# Patient Record
Sex: MALE | Race: WHITE | NOT HISPANIC OR LATINO | Employment: FULL TIME | URBAN - METROPOLITAN AREA
[De-identification: names, ages, dates, MRNs, and addresses within clinical notes are randomized per-mention and may not be internally consistent; named-entity substitution may affect disease eponyms.]

---

## 2023-11-27 ENCOUNTER — OFFICE VISIT (OUTPATIENT)
Dept: SURGERY | Facility: CLINIC | Age: 58
End: 2023-11-27
Payer: COMMERCIAL

## 2023-11-27 VITALS
DIASTOLIC BLOOD PRESSURE: 82 MMHG | BODY MASS INDEX: 26.43 KG/M2 | OXYGEN SATURATION: 97 % | HEIGHT: 68 IN | WEIGHT: 174.4 LBS | SYSTOLIC BLOOD PRESSURE: 110 MMHG | RESPIRATION RATE: 16 BRPM | HEART RATE: 79 BPM | TEMPERATURE: 98.2 F

## 2023-11-27 DIAGNOSIS — K40.20 NON-RECURRENT BILATERAL INGUINAL HERNIA WITHOUT OBSTRUCTION OR GANGRENE: Primary | ICD-10-CM

## 2023-11-27 DIAGNOSIS — K42.9 UMBILICAL HERNIA WITHOUT OBSTRUCTION AND WITHOUT GANGRENE: ICD-10-CM

## 2023-11-27 PROCEDURE — 99204 OFFICE O/P NEW MOD 45 MIN: CPT | Performed by: SURGERY

## 2023-11-27 PROCEDURE — 1036F TOBACCO NON-USER: CPT | Performed by: SURGERY

## 2023-11-27 RX ORDER — SODIUM CHLORIDE, SODIUM LACTATE, POTASSIUM CHLORIDE, CALCIUM CHLORIDE 600; 310; 30; 20 MG/100ML; MG/100ML; MG/100ML; MG/100ML
100 INJECTION, SOLUTION INTRAVENOUS CONTINUOUS
Status: CANCELLED | OUTPATIENT
Start: 2023-11-27

## 2023-11-27 RX ORDER — CEFAZOLIN SODIUM 2 G/100ML
2 INJECTION, SOLUTION INTRAVENOUS ONCE
Status: CANCELLED | OUTPATIENT
Start: 2024-02-01 | End: 2023-11-27

## 2023-11-27 NOTE — PROGRESS NOTES
History Of Present Illness  The patient is a 58-year-old male who complains of bilateral inguinal hernias and an umbilical hernia.  The right inguinal hernia occurred 40 years ago after lifting a box.  He initially had pain in the area, but never had it repaired.  He no longer has right inguinal pain.  He does notice a bulge.  The left inguinal hernia and umbilical hernias have been present for about 6 years.  Both have been enlarging and he has discomfort at both areas.  He cannot recall any injury prior to onset of these hernias.          Past Medical History  He has no past medical history on file.    Surgical History  He has a past surgical history that includes Other surgical history (07/12/2021).     Allergies  Patient has no known allergies.    Social History  He reports that he has never smoked. He has never used smokeless tobacco. He reports that he does not currently use alcohol. He reports that he does not use drugs.    Family History  No family history on file.    Review of Systems  Review of Systems:  Constitutional:  no fever, no chills, no significant weight change  Neurological: No history of CVA or seizure disorder  Eyes: No pain, no recent visual change  ENT:  No recent hearing loss  Neck: No pain  Cardiovascular: No chest pain, no history of cardiac disease such as myocardial infarction or arrhythmia or congestive heart failure  Pulmonary: No shortness of breath, no history of pulmonary disease such as pneumonia or COPD  Breast: No history of breast disease or breast mass  Gastrointestinal:   Periumbilical pain.  No nausea or vomiting, no constipation or diarrhea or blood in the stool.  No history of ulcers.  No liver, gallbladder or pancreas disease.  No intestinal disorder.  Genitourinary: No hematuria or dysuria, no kidney disease  Musculoskeletal:  no arthralgia, no muscle or bone pain  Integumentary:  no rash  Psychiatric: Anxiety  Endocrine:  no history of diabetes  Hematologic/Lymphatic: No  "easy bruising or bleeding          Last Recorded Vitals  Blood pressure 110/82, pulse 79, temperature 36.8 °C (98.2 °F), temperature source Temporal, resp. rate 16, height 1.727 m (5' 8\"), weight 79.1 kg (174 lb 6.4 oz), SpO2 97 %.    Physical Exam  Constitutional: Well-developed, well-nourished, alert and oriented, no acute distress  Skin: Warm and dry, no lesions, no rashes, no jaundice  HEENT: Normocephalic, atraumatic, EOMI, no scleral icterus, eyes have no redness or swelling or discharge, external inspection of ears and nose is normal, mucous membranes moist  Neck: Soft, nontender, no mass or adenopathy  Cardiac: Regular rate and rhythm, no murmur  Chest: Patent airway, clear to auscultation, normal breath sounds with good chest expansion, no wheezes or rales or rhonchi noted, thorax symmetric  Abdomen: Nondistended, positive bowel sounds, soft, nontender, no mass.  Umbilical hernia 3 cm diameter hernia sac and approximately 1 to 1.5 cm diameter fascial defect.  Easily reducible.  Bilateral inguinal hernias, reducible.  No femoral hernias palpable.  Rectal: Not performed  Extremities: No injury, no lower extremity edema or calf tenderness  Lymphatic: No cervical adenopathy  Musculoskeletal: Range of motion intact, no joint swelling, normal strength  Neurological: Alert and oriented x3, intact sensory and motor function, no obvious focal neurologic abnormalities, normal gait  Psychological: Appropriate mood and behavior  Patient declined a chaperone       Assessment/Plan   Diagnoses and all orders for this visit:  Non-recurrent bilateral inguinal hernia without obstruction or gangrene  -     Case Request Operating Room: Repair Inguinal Hernia Robot-Assisted, Robotic assisted laparoscopic bilateral inguinal hernia repair with mesh.  Possible open repair.  Open repair of umbilical hernia possibly with mesh.; Standing  Umbilical hernia without obstruction and without gangrene  -     Case Request Operating Room: " Repair Inguinal Hernia Robot-Assisted, Robotic assisted laparoscopic bilateral inguinal hernia repair with mesh.  Possible open repair.  Open repair of umbilical hernia possibly with mesh.; Standing  Other orders  -     Place in outpatient/hospital ambulatory surgery; Standing  -     Vital Signs; Standing  -     Pulse oximetry, spot; Standing  -     Apply sequential compression device; Standing  -     Insert peripheral IV; Standing  -     Saline lock IV; Standing  -     lactated Ringer's infusion  -     Full code; Standing  -     ceFAZolin (Ancef) 2 g in dextrose 5 % in water (D5W) 50 mL IV      58-year-old male with bilateral inguinal hernias and umbilical hernia.  All of the hernias are reducible.  The left inguinal hernia and umbilical hernia are symptomatic.  The right inguinal hernia is asymptomatic.  Recommend repair of the bilateral inguinal hernias with mesh either open or laparoscopic and open repair of the umbilical hernia possibly with mesh.  I discussed the procedure and risks with the patient. The risks include bleeding, infection, mesh infection, recurrence, injury to surrounding structures such as nerves/blood vessels/intestine/bladder, chronic postop pain, cardiac/pulmonary complications.   If the mesh becomes infected it could require excision.  I discussed the alternative of hernia repair without mesh and observation.  I answered numerous questions from the patient.  The patient agrees to have robotic assisted laparoscopic bilateral inguinal herniorrhaphy with mesh with possible open operation and open repair of the umbilical hernia possibly with mesh.  Electronic consent obtained.         Isidro Chance MD

## 2023-11-27 NOTE — LETTER
November 27, 2023     Georgi Burden MD  [address] [facility] [address]    Patient: Frank Shea   YOB: 1965   Date of Visit: 11/27/2023       Dear Dr. Georgi Burden MD:    Thank you for referring Frakn Shea to me for evaluation. Below are my notes for this consultation.  If you have questions, please do not hesitate to call me. I look forward to following your patient along with you.       Sincerely,     Isidro Chance MD      CC: No Recipients  ______________________________________________________________________________________    History Of Present Illness  The patient is a 58-year-old male who complains of bilateral inguinal hernias and an umbilical hernia.  The right inguinal hernia occurred 40 years ago after lifting a box.  He initially had pain in the area, but never had it repaired.  He no longer has right inguinal pain.  He does notice a bulge.  The left inguinal hernia and umbilical hernias have been present for about 6 years.  Both have been enlarging and he has discomfort at both areas.  He cannot recall any injury prior to onset of these hernias.          Past Medical History  He has no past medical history on file.    Surgical History  He has a past surgical history that includes Other surgical history (07/12/2021).     Allergies  Patient has no known allergies.    Social History  He reports that he has never smoked. He has never used smokeless tobacco. He reports that he does not currently use alcohol. He reports that he does not use drugs.    Family History  No family history on file.    Review of Systems  Review of Systems:  Constitutional:  no fever, no chills, no significant weight change  Neurological: No history of CVA or seizure disorder  Eyes: No pain, no recent visual change  ENT:  No recent hearing loss  Neck: No pain  Cardiovascular: No chest pain, no history of cardiac disease such as myocardial infarction or arrhythmia or congestive heart failure  Pulmonary:  "No shortness of breath, no history of pulmonary disease such as pneumonia or COPD  Breast: No history of breast disease or breast mass  Gastrointestinal:   Periumbilical pain.  No nausea or vomiting, no constipation or diarrhea or blood in the stool.  No history of ulcers.  No liver, gallbladder or pancreas disease.  No intestinal disorder.  Genitourinary: No hematuria or dysuria, no kidney disease  Musculoskeletal:  no arthralgia, no muscle or bone pain  Integumentary:  no rash  Psychiatric: Anxiety  Endocrine:  no history of diabetes  Hematologic/Lymphatic: No easy bruising or bleeding          Last Recorded Vitals  Blood pressure 110/82, pulse 79, temperature 36.8 °C (98.2 °F), temperature source Temporal, resp. rate 16, height 1.727 m (5' 8\"), weight 79.1 kg (174 lb 6.4 oz), SpO2 97 %.    Physical Exam  Constitutional: Well-developed, well-nourished, alert and oriented, no acute distress  Skin: Warm and dry, no lesions, no rashes, no jaundice  HEENT: Normocephalic, atraumatic, EOMI, no scleral icterus, eyes have no redness or swelling or discharge, external inspection of ears and nose is normal, mucous membranes moist  Neck: Soft, nontender, no mass or adenopathy  Cardiac: Regular rate and rhythm, no murmur  Chest: Patent airway, clear to auscultation, normal breath sounds with good chest expansion, no wheezes or rales or rhonchi noted, thorax symmetric  Abdomen: Nondistended, positive bowel sounds, soft, nontender, no mass.  Umbilical hernia 3 cm diameter hernia sac and approximately 1 to 1.5 cm diameter fascial defect.  Easily reducible.  Bilateral inguinal hernias, reducible.  No femoral hernias palpable.  Rectal: Not performed  Extremities: No injury, no lower extremity edema or calf tenderness  Lymphatic: No cervical adenopathy  Musculoskeletal: Range of motion intact, no joint swelling, normal strength  Neurological: Alert and oriented x3, intact sensory and motor function, no obvious focal neurologic " abnormalities, normal gait  Psychological: Appropriate mood and behavior  Patient declined a chaperone       Assessment/Plan   Diagnoses and all orders for this visit:  Non-recurrent bilateral inguinal hernia without obstruction or gangrene  -     Case Request Operating Room: Repair Inguinal Hernia Robot-Assisted, Robotic assisted laparoscopic bilateral inguinal hernia repair with mesh.  Possible open repair.  Open repair of umbilical hernia possibly with mesh.; Standing  Umbilical hernia without obstruction and without gangrene  -     Case Request Operating Room: Repair Inguinal Hernia Robot-Assisted, Robotic assisted laparoscopic bilateral inguinal hernia repair with mesh.  Possible open repair.  Open repair of umbilical hernia possibly with mesh.; Standing  Other orders  -     Place in outpatient/hospital ambulatory surgery; Standing  -     Vital Signs; Standing  -     Pulse oximetry, spot; Standing  -     Apply sequential compression device; Standing  -     Insert peripheral IV; Standing  -     Saline lock IV; Standing  -     lactated Ringer's infusion  -     Full code; Standing  -     ceFAZolin (Ancef) 2 g in dextrose 5 % in water (D5W) 50 mL IV      58-year-old male with bilateral inguinal hernias and umbilical hernia.  All of the hernias are reducible.  The left inguinal hernia and umbilical hernia are symptomatic.  The right inguinal hernia is asymptomatic.  Recommend repair of the bilateral inguinal hernias with mesh either open or laparoscopic and open repair of the umbilical hernia possibly with mesh.  I discussed the procedure and risks with the patient. The risks include bleeding, infection, mesh infection, recurrence, injury to surrounding structures such as nerves/blood vessels/intestine/bladder, chronic postop pain, cardiac/pulmonary complications.   If the mesh becomes infected it could require excision.  I discussed the alternative of hernia repair without mesh and observation.  I answered numerous  questions from the patient.  The patient agrees to have robotic assisted laparoscopic bilateral inguinal herniorrhaphy with mesh with possible open operation and open repair of the umbilical hernia possibly with mesh.  Electronic consent obtained.         Isidro Chance MD

## 2023-12-18 NOTE — PROGRESS NOTES
History Of Present Illness  November 27, 2023  The patient is a 58-year-old male who complains of bilateral inguinal hernias and an umbilical hernia.  The right inguinal hernia occurred 40 years ago after lifting a box.  He initially had pain in the area, but never had it repaired.  He no longer has right inguinal pain.  He does notice a bulge.  The left inguinal hernia and umbilical hernias have been present for about 6 years.  Both have been enlarging and he has discomfort at both areas.  He cannot recall any injury prior to onset of these hernias    December 20, 2023  The patient changed his scheduled date for operation from January 4, 2024 to February 1, 2024.  Therefore this visit was canceled.  He will follow-up preop.     Past Medical History  He has no past medical history on file.    Surgical History  He has a past surgical history that includes Other surgical history (07/12/2021).     Allergies  Patient has no known allergies.    Social History  He reports that he has never smoked. He has never used smokeless tobacco. He reports that he does not currently use alcohol. He reports that he does not use drugs.    Family History  No family history on file.    Last Recorded Vitals  There were no vitals taken for this visit.    Physical Exam   ***     Assessment/Plan   Diagnoses and all orders for this visit:  Non-recurrent bilateral inguinal hernia without obstruction or gangrene  Umbilical hernia without obstruction and without gangrene      58-year-old male with bilateral inguinal hernias and umbilical hernia.  All of the hernias are reducible.  The left inguinal hernia and umbilical hernia are symptomatic.  The right inguinal hernia is asymptomatic.    Recommend repair of the bilateral inguinal hernias with mesh either open or laparoscopic and open repair of the umbilical hernia possibly with mesh.  I discussed the procedure and risks with the patient. The risks include bleeding, infection, mesh infection,  recurrence, injury to surrounding structures such as nerves/blood vessels/intestine/bladder, chronic postop pain, cardiac/pulmonary complications.   If the mesh becomes infected it could require excision.  I discussed the alternative of hernia repair without mesh and observation.  I answered numerous questions from the patient.  The patient agrees to have robotic assisted laparoscopic bilateral inguinal herniorrhaphy with mesh with possible open operation and open repair of the umbilical hernia possibly with mesh.   Electronic consent previously obtained.           Isidro Chance MD

## 2023-12-20 ENCOUNTER — OFFICE VISIT (OUTPATIENT)
Dept: SURGERY | Facility: CLINIC | Age: 58
End: 2023-12-20
Payer: COMMERCIAL

## 2023-12-20 VITALS
RESPIRATION RATE: 16 BRPM | BODY MASS INDEX: 26.55 KG/M2 | HEIGHT: 68 IN | WEIGHT: 175.2 LBS | DIASTOLIC BLOOD PRESSURE: 80 MMHG | SYSTOLIC BLOOD PRESSURE: 115 MMHG | HEART RATE: 90 BPM | TEMPERATURE: 98.2 F | OXYGEN SATURATION: 97 %

## 2024-01-19 NOTE — H&P (VIEW-ONLY)
History Of Present Illness  HPI    November 27, 2023  The patient is a 58-year-old male who complains of bilateral inguinal hernias and an umbilical hernia.  The right inguinal hernia occurred 40 years ago after lifting a box.  He initially had pain in the area, but never had it repaired.  He no longer has right inguinal pain.  He does notice a bulge.  The left inguinal hernia and umbilical hernias have been present for about 6 years.  Both have been enlarging and he has discomfort at both areas.  He cannot recall any injury prior to onset of these hernias     January 22, 2024  The patient follows up for his bilateral inguinal hernias and umbilical hernia.  He presently is asymptomatic.  The hernias have not changed.  The patient was diagnosed with COVID-19 infection on December 26, 2023.  His symptoms lasted 4 days and he did not require hospitalization.  He now feels well.  Past medical history is otherwise unchanged.     Past Medical History  He has no past medical history on file.    Surgical History  He has a past surgical history that includes Other surgical history (07/12/2021).     Allergies  Patient has no known allergies.    Social History  He reports that he has never smoked. He has never used smokeless tobacco. He reports that he does not currently use alcohol. He reports that he does not use drugs.    Family History  No family history on file.    Last Recorded Vitals  Blood pressure 111/72, pulse 77, temperature 36.7 °C (98.1 °F), temperature source Temporal, resp. rate 16, weight 79.2 kg (174 lb 9.6 oz), SpO2 97 %.    Physical Exam   Constitutional: Well-developed, well-nourished, alert and oriented, no acute distress  Skin: Warm and dry, no lesions, no rashes, no jaundice  HEENT: Normocephalic, atraumatic, EOMI, no scleral icterus, eyes have no redness or swelling or discharge, external inspection of ears and nose is normal, mucous membranes moist  Neck: Soft, nontender, no mass or  adenopathy  Cardiac: Regular rate and rhythm, no murmur  Chest: Patent airway, clear to auscultation, normal breath sounds with good chest expansion, no wheezes or rales or rhonchi noted, thorax symmetric  Abdomen: Nondistended, positive bowel sounds, soft, nontender, no mass.  Umbilical hernia 3 cm diameter hernia sac and approximately 1 to 1.5 cm diameter fascial defect.  Reducible  Bilateral inguinal hernias, reducible.  No femoral hernias palpable.  Rectal: Not performed  Extremities: No injury, no lower extremity edema or calf tenderness  Lymphatic: No cervical adenopathy  Musculoskeletal: Range of motion intact, no joint swelling, normal strength  Neurological: Alert and oriented x3, intact sensory and motor function, no obvious focal neurologic abnormalities, normal gait  Psychological: Appropriate mood and behavior  Patient declined a chaperone     Assessment/Plan   Diagnoses and all orders for this visit:  Non-recurrent bilateral inguinal hernia without obstruction or gangrene  Umbilical hernia without obstruction and without gangrene      58-year-old male with bilateral inguinal hernias and umbilical hernia.  All of the hernias are reducible.  The left inguinal hernia and umbilical hernia have been symptomatic.  The right inguinal hernia is asymptomatic.    Recommend repair of the bilateral inguinal hernias with mesh either open or laparoscopic and open repair of the umbilical hernia possibly with mesh.  I have discussed the procedure and risks with the patient. The risks include bleeding, infection, mesh infection, recurrence, injury to surrounding structures such as nerves/blood vessels/intestine/bladder, chronic postop pain, cardiac/pulmonary complications.   If the mesh becomes infected it could require excision.  I have discussed the alternative of hernia repair without mesh and observation.  I again answered multiple questions from the patient.  The patient agrees to have robotic assisted  laparoscopic bilateral inguinal herniorrhaphy with mesh with possible open operation and open repair of the umbilical hernia possibly with mesh.   Electronic consent previously obtained.            Isidro Chance MD

## 2024-01-22 ENCOUNTER — OFFICE VISIT (OUTPATIENT)
Dept: SURGERY | Facility: CLINIC | Age: 59
End: 2024-01-22
Payer: COMMERCIAL

## 2024-01-22 VITALS
HEART RATE: 77 BPM | WEIGHT: 174.6 LBS | RESPIRATION RATE: 16 BRPM | SYSTOLIC BLOOD PRESSURE: 111 MMHG | TEMPERATURE: 98.1 F | BODY MASS INDEX: 26.55 KG/M2 | DIASTOLIC BLOOD PRESSURE: 72 MMHG | OXYGEN SATURATION: 97 %

## 2024-01-22 DIAGNOSIS — K42.9 UMBILICAL HERNIA WITHOUT OBSTRUCTION AND WITHOUT GANGRENE: ICD-10-CM

## 2024-01-22 DIAGNOSIS — K40.20 NON-RECURRENT BILATERAL INGUINAL HERNIA WITHOUT OBSTRUCTION OR GANGRENE: Primary | ICD-10-CM

## 2024-01-22 PROCEDURE — 99213 OFFICE O/P EST LOW 20 MIN: CPT | Performed by: SURGERY

## 2024-01-22 PROCEDURE — 1036F TOBACCO NON-USER: CPT | Performed by: SURGERY

## 2024-01-22 NOTE — LETTER
January 22, 2024     Georgi Burden MD  [address] [facility] [address]    Patient: Frank Shea   YOB: 1965   Date of Visit: 1/22/2024       Dear Dr. Georgi Burden MD:    Thank you for referring Frank Shea to me for evaluation. Below are my notes for this consultation.  If you have questions, please do not hesitate to call me. I look forward to following your patient along with you.       Sincerely,     Isidro Chance MD      CC: No Recipients  ______________________________________________________________________________________    History Of Present Illness  HPI    November 27, 2023  The patient is a 58-year-old male who complains of bilateral inguinal hernias and an umbilical hernia.  The right inguinal hernia occurred 40 years ago after lifting a box.  He initially had pain in the area, but never had it repaired.  He no longer has right inguinal pain.  He does notice a bulge.  The left inguinal hernia and umbilical hernias have been present for about 6 years.  Both have been enlarging and he has discomfort at both areas.  He cannot recall any injury prior to onset of these hernias     January 22, 2024  The patient follows up for his bilateral inguinal hernias and umbilical hernia.  He presently is asymptomatic.  The hernias have not changed.  The patient was diagnosed with COVID-19 infection on December 26, 2023.  His symptoms lasted 4 days and he did not require hospitalization.  He now feels well.  Past medical history is otherwise unchanged.     Past Medical History  He has no past medical history on file.    Surgical History  He has a past surgical history that includes Other surgical history (07/12/2021).     Allergies  Patient has no known allergies.    Social History  He reports that he has never smoked. He has never used smokeless tobacco. He reports that he does not currently use alcohol. He reports that he does not use drugs.    Family History  No family history on  file.    Last Recorded Vitals  Blood pressure 111/72, pulse 77, temperature 36.7 °C (98.1 °F), temperature source Temporal, resp. rate 16, weight 79.2 kg (174 lb 9.6 oz), SpO2 97 %.    Physical Exam   Constitutional: Well-developed, well-nourished, alert and oriented, no acute distress  Skin: Warm and dry, no lesions, no rashes, no jaundice  HEENT: Normocephalic, atraumatic, EOMI, no scleral icterus, eyes have no redness or swelling or discharge, external inspection of ears and nose is normal, mucous membranes moist  Neck: Soft, nontender, no mass or adenopathy  Cardiac: Regular rate and rhythm, no murmur  Chest: Patent airway, clear to auscultation, normal breath sounds with good chest expansion, no wheezes or rales or rhonchi noted, thorax symmetric  Abdomen: Nondistended, positive bowel sounds, soft, nontender, no mass.  Umbilical hernia 3 cm diameter hernia sac and approximately 1 to 1.5 cm diameter fascial defect.  Reducible  Bilateral inguinal hernias, reducible.  No femoral hernias palpable.  Rectal: Not performed  Extremities: No injury, no lower extremity edema or calf tenderness  Lymphatic: No cervical adenopathy  Musculoskeletal: Range of motion intact, no joint swelling, normal strength  Neurological: Alert and oriented x3, intact sensory and motor function, no obvious focal neurologic abnormalities, normal gait  Psychological: Appropriate mood and behavior  Patient declined a chaperone     Assessment/Plan  Diagnoses and all orders for this visit:  Non-recurrent bilateral inguinal hernia without obstruction or gangrene  Umbilical hernia without obstruction and without gangrene      58-year-old male with bilateral inguinal hernias and umbilical hernia.  All of the hernias are reducible.  The left inguinal hernia and umbilical hernia have been symptomatic.  The right inguinal hernia is asymptomatic.    Recommend repair of the bilateral inguinal hernias with mesh either open or laparoscopic and open repair  of the umbilical hernia possibly with mesh.  I have discussed the procedure and risks with the patient. The risks include bleeding, infection, mesh infection, recurrence, injury to surrounding structures such as nerves/blood vessels/intestine/bladder, chronic postop pain, cardiac/pulmonary complications.   If the mesh becomes infected it could require excision.  I have discussed the alternative of hernia repair without mesh and observation.  I again answered multiple questions from the patient.  The patient agrees to have robotic assisted laparoscopic bilateral inguinal herniorrhaphy with mesh with possible open operation and open repair of the umbilical hernia possibly with mesh.   Electronic consent previously obtained.            Isidro Chance MD

## 2024-01-31 NOTE — PREPROCEDURE INSTRUCTIONS
No outpatient medications have been marked as taking for the 2/1/24 encounter (Hospital Encounter).           NPO Instructions:  Additional Instructions:   Enter through main entrance of main hospital building, located at [address] [facility]. Proceed to registration, located in the back right hand corner. You will need your ID and insurance card for registration. Please ensure you have a responsible adult to drive you home.     Shower the morning of or night before your procedure. After you shower avoid lotions, powders, deodorants or anything applied to the skin. If you wear contacts or glasses, wear the glasses. If you do not have glasses, please bring a case for your contacts. You may wear hearing aids and dentures, bring a case for them or we will provide one. Make sure you wear something loose and comfortable. Keep in mind your surgery type and wear something that will accommodate incisions or bandages. Please remove all jewelry.     Nothing to eat or drink after midnight (including coffee, tea, gum etc). You may take medications discussed during phone call with a small sip of water.    For further questions Trey PALACIO can be contacted at [phone] between 7AM-3PM.

## 2024-02-01 ENCOUNTER — ANESTHESIA EVENT (OUTPATIENT)
Dept: OPERATING ROOM | Facility: HOSPITAL | Age: 59
End: 2024-02-01
Payer: COMMERCIAL

## 2024-02-01 ENCOUNTER — HOSPITAL ENCOUNTER (OUTPATIENT)
Dept: CARDIOLOGY | Facility: HOSPITAL | Age: 59
Discharge: HOME | End: 2024-02-01
Payer: COMMERCIAL

## 2024-02-01 ENCOUNTER — ANESTHESIA (OUTPATIENT)
Dept: OPERATING ROOM | Facility: HOSPITAL | Age: 59
End: 2024-02-01
Payer: COMMERCIAL

## 2024-02-01 ENCOUNTER — HOSPITAL ENCOUNTER (OUTPATIENT)
Facility: HOSPITAL | Age: 59
Setting detail: OUTPATIENT SURGERY
Discharge: HOME | End: 2024-02-01
Attending: SURGERY | Admitting: SURGERY
Payer: COMMERCIAL

## 2024-02-01 VITALS
HEIGHT: 68 IN | DIASTOLIC BLOOD PRESSURE: 85 MMHG | RESPIRATION RATE: 16 BRPM | HEART RATE: 80 BPM | BODY MASS INDEX: 26.46 KG/M2 | OXYGEN SATURATION: 100 % | SYSTOLIC BLOOD PRESSURE: 129 MMHG | WEIGHT: 174.6 LBS | TEMPERATURE: 97.9 F

## 2024-02-01 DIAGNOSIS — K40.20 NON-RECURRENT BILATERAL INGUINAL HERNIA WITHOUT OBSTRUCTION OR GANGRENE: Primary | ICD-10-CM

## 2024-02-01 DIAGNOSIS — K42.9 UMBILICAL HERNIA WITHOUT OBSTRUCTION AND WITHOUT GANGRENE: ICD-10-CM

## 2024-02-01 LAB
HCT VFR BLD AUTO: 47.1 % (ref 41–52)
HGB BLD-MCNC: 16.5 G/DL (ref 13.5–17.5)

## 2024-02-01 PROCEDURE — C1781 MESH (IMPLANTABLE): HCPCS | Performed by: SURGERY

## 2024-02-01 PROCEDURE — 7100000002 HC RECOVERY ROOM TIME - EACH INCREMENTAL 1 MINUTE: Performed by: SURGERY

## 2024-02-01 PROCEDURE — 7100000009 HC PHASE TWO TIME - INITIAL BASE CHARGE: Performed by: SURGERY

## 2024-02-01 PROCEDURE — 2500000004 HC RX 250 GENERAL PHARMACY W/ HCPCS (ALT 636 FOR OP/ED): Performed by: ANESTHESIOLOGY

## 2024-02-01 PROCEDURE — 3700000001 HC GENERAL ANESTHESIA TIME - INITIAL BASE CHARGE: Performed by: SURGERY

## 2024-02-01 PROCEDURE — 2720000007 HC OR 272 NO HCPCS: Performed by: SURGERY

## 2024-02-01 PROCEDURE — 49650 LAP ING HERNIA REPAIR INIT: CPT | Performed by: SURGERY

## 2024-02-01 PROCEDURE — 2500000005 HC RX 250 GENERAL PHARMACY W/O HCPCS

## 2024-02-01 PROCEDURE — 93010 ELECTROCARDIOGRAM REPORT: CPT | Performed by: INTERNAL MEDICINE

## 2024-02-01 PROCEDURE — 36415 COLL VENOUS BLD VENIPUNCTURE: CPT | Performed by: SURGERY

## 2024-02-01 PROCEDURE — 49591 RPR AA HRN 1ST < 3 CM RDC: CPT | Performed by: SURGERY

## 2024-02-01 PROCEDURE — 93005 ELECTROCARDIOGRAM TRACING: CPT

## 2024-02-01 PROCEDURE — 3600000004 HC OR TIME - INITIAL BASE CHARGE - PROCEDURE LEVEL FOUR: Performed by: SURGERY

## 2024-02-01 PROCEDURE — 85014 HEMATOCRIT: CPT | Performed by: SURGERY

## 2024-02-01 PROCEDURE — 3600000009 HC OR TIME - EACH INCREMENTAL 1 MINUTE - PROCEDURE LEVEL FOUR: Performed by: SURGERY

## 2024-02-01 PROCEDURE — 7100000001 HC RECOVERY ROOM TIME - INITIAL BASE CHARGE: Performed by: SURGERY

## 2024-02-01 PROCEDURE — 2500000005 HC RX 250 GENERAL PHARMACY W/O HCPCS: Performed by: SURGERY

## 2024-02-01 PROCEDURE — 2500000004 HC RX 250 GENERAL PHARMACY W/ HCPCS (ALT 636 FOR OP/ED): Performed by: SURGERY

## 2024-02-01 PROCEDURE — 2780000003 HC OR 278 NO HCPCS: Performed by: SURGERY

## 2024-02-01 PROCEDURE — 7100000010 HC PHASE TWO TIME - EACH INCREMENTAL 1 MINUTE: Performed by: SURGERY

## 2024-02-01 PROCEDURE — 2500000004 HC RX 250 GENERAL PHARMACY W/ HCPCS (ALT 636 FOR OP/ED)

## 2024-02-01 PROCEDURE — 3700000002 HC GENERAL ANESTHESIA TIME - EACH INCREMENTAL 1 MINUTE: Performed by: SURGERY

## 2024-02-01 DEVICE — LAPAROSCOPIC SELF-FIXATING MESH POLYESTER WITH POLYLACTIC ACID GRIPS AND COLLAGEN FILM
Type: IMPLANTABLE DEVICE | Site: INGUINAL | Status: FUNCTIONAL
Brand: PROGRIP

## 2024-02-01 RX ORDER — ONDANSETRON HYDROCHLORIDE 2 MG/ML
INJECTION, SOLUTION INTRAVENOUS AS NEEDED
Status: DISCONTINUED | OUTPATIENT
Start: 2024-02-01 | End: 2024-02-01

## 2024-02-01 RX ORDER — DEXAMETHASONE SODIUM PHOSPHATE 10 MG/ML
6 INJECTION INTRAMUSCULAR; INTRAVENOUS ONCE
Status: DISCONTINUED | OUTPATIENT
Start: 2024-02-01 | End: 2024-02-01 | Stop reason: HOSPADM

## 2024-02-01 RX ORDER — KETOROLAC TROMETHAMINE 30 MG/ML
INJECTION, SOLUTION INTRAMUSCULAR; INTRAVENOUS AS NEEDED
Status: DISCONTINUED | OUTPATIENT
Start: 2024-02-01 | End: 2024-02-01

## 2024-02-01 RX ORDER — OXYCODONE HYDROCHLORIDE 5 MG/1
5 TABLET ORAL EVERY 6 HOURS PRN
Qty: 8 TABLET | Refills: 0 | Status: SHIPPED | OUTPATIENT
Start: 2024-02-01

## 2024-02-01 RX ORDER — CEFAZOLIN SODIUM 2 G/100ML
2 INJECTION, SOLUTION INTRAVENOUS ONCE
Status: COMPLETED | OUTPATIENT
Start: 2024-02-01 | End: 2024-02-01

## 2024-02-01 RX ORDER — MIDAZOLAM HYDROCHLORIDE 1 MG/ML
INJECTION, SOLUTION INTRAMUSCULAR; INTRAVENOUS AS NEEDED
Status: DISCONTINUED | OUTPATIENT
Start: 2024-02-01 | End: 2024-02-01

## 2024-02-01 RX ORDER — LIDOCAINE HCL/PF 100 MG/5ML
SYRINGE (ML) INTRAVENOUS AS NEEDED
Status: DISCONTINUED | OUTPATIENT
Start: 2024-02-01 | End: 2024-02-01

## 2024-02-01 RX ORDER — MEPERIDINE HYDROCHLORIDE 25 MG/ML
12.5 INJECTION INTRAMUSCULAR; INTRAVENOUS; SUBCUTANEOUS EVERY 10 MIN PRN
Status: DISCONTINUED | OUTPATIENT
Start: 2024-02-01 | End: 2024-02-01 | Stop reason: HOSPADM

## 2024-02-01 RX ORDER — ALBUTEROL SULFATE 0.83 MG/ML
2.5 SOLUTION RESPIRATORY (INHALATION) ONCE AS NEEDED
Status: DISCONTINUED | OUTPATIENT
Start: 2024-02-01 | End: 2024-02-01 | Stop reason: HOSPADM

## 2024-02-01 RX ORDER — ACETAMINOPHEN 325 MG/1
650 TABLET ORAL EVERY 4 HOURS PRN
Status: DISCONTINUED | OUTPATIENT
Start: 2024-02-01 | End: 2024-02-01 | Stop reason: HOSPADM

## 2024-02-01 RX ORDER — ROCURONIUM BROMIDE 10 MG/ML
INJECTION, SOLUTION INTRAVENOUS AS NEEDED
Status: DISCONTINUED | OUTPATIENT
Start: 2024-02-01 | End: 2024-02-01

## 2024-02-01 RX ORDER — LIDOCAINE HYDROCHLORIDE 40 MG/ML
INJECTION, SOLUTION RETROBULBAR AS NEEDED
Status: DISCONTINUED | OUTPATIENT
Start: 2024-02-01 | End: 2024-02-01

## 2024-02-01 RX ORDER — PROPOFOL 10 MG/ML
INJECTION, EMULSION INTRAVENOUS AS NEEDED
Status: DISCONTINUED | OUTPATIENT
Start: 2024-02-01 | End: 2024-02-01

## 2024-02-01 RX ORDER — SODIUM CHLORIDE, SODIUM LACTATE, POTASSIUM CHLORIDE, CALCIUM CHLORIDE 600; 310; 30; 20 MG/100ML; MG/100ML; MG/100ML; MG/100ML
100 INJECTION, SOLUTION INTRAVENOUS CONTINUOUS
Status: DISCONTINUED | OUTPATIENT
Start: 2024-02-01 | End: 2024-02-01 | Stop reason: HOSPADM

## 2024-02-01 RX ORDER — FENTANYL CITRATE 50 UG/ML
INJECTION, SOLUTION INTRAMUSCULAR; INTRAVENOUS AS NEEDED
Status: DISCONTINUED | OUTPATIENT
Start: 2024-02-01 | End: 2024-02-01

## 2024-02-01 RX ORDER — DEXAMETHASONE SODIUM PHOSPHATE 4 MG/ML
INJECTION, SOLUTION INTRA-ARTICULAR; INTRALESIONAL; INTRAMUSCULAR; INTRAVENOUS; SOFT TISSUE AS NEEDED
Status: DISCONTINUED | OUTPATIENT
Start: 2024-02-01 | End: 2024-02-01

## 2024-02-01 RX ORDER — GABAPENTIN 300 MG/1
300 CAPSULE ORAL 3 TIMES DAILY PRN
Qty: 15 CAPSULE | Refills: 0 | Status: SHIPPED | OUTPATIENT
Start: 2024-02-01

## 2024-02-01 RX ORDER — ONDANSETRON HYDROCHLORIDE 2 MG/ML
4 INJECTION, SOLUTION INTRAVENOUS ONCE AS NEEDED
Status: DISCONTINUED | OUTPATIENT
Start: 2024-02-01 | End: 2024-02-01 | Stop reason: HOSPADM

## 2024-02-01 RX ORDER — BUPIVACAINE HYDROCHLORIDE 5 MG/ML
INJECTION, SOLUTION PERINEURAL AS NEEDED
Status: DISCONTINUED | OUTPATIENT
Start: 2024-02-01 | End: 2024-02-01 | Stop reason: HOSPADM

## 2024-02-01 RX ADMIN — FENTANYL CITRATE 50 MCG: 50 INJECTION, SOLUTION INTRAMUSCULAR; INTRAVENOUS at 08:10

## 2024-02-01 RX ADMIN — SODIUM CHLORIDE, SODIUM LACTATE, POTASSIUM CHLORIDE, AND CALCIUM CHLORIDE 100 ML/HR: 600; 310; 30; 20 INJECTION, SOLUTION INTRAVENOUS at 07:16

## 2024-02-01 RX ADMIN — HYDROMORPHONE HYDROCHLORIDE 0.5 MG: 1 INJECTION, SOLUTION INTRAMUSCULAR; INTRAVENOUS; SUBCUTANEOUS at 11:24

## 2024-02-01 RX ADMIN — PROPOFOL 200 MG: 10 INJECTION, EMULSION INTRAVENOUS at 07:35

## 2024-02-01 RX ADMIN — SUGAMMADEX 200 MG: 100 INJECTION, SOLUTION INTRAVENOUS at 10:01

## 2024-02-01 RX ADMIN — ONDANSETRON 4 MG: 2 INJECTION INTRAMUSCULAR; INTRAVENOUS at 09:50

## 2024-02-01 RX ADMIN — KETOROLAC TROMETHAMINE 30 MG: 30 INJECTION, SOLUTION INTRAMUSCULAR; INTRAVENOUS at 09:50

## 2024-02-01 RX ADMIN — LIDOCAINE HYDROCHLORIDE 3 ML: 40 INJECTION, SOLUTION RETROBULBAR; TOPICAL at 07:38

## 2024-02-01 RX ADMIN — ROCURONIUM BROMIDE 20 MG: 10 INJECTION, SOLUTION INTRAVENOUS at 08:06

## 2024-02-01 RX ADMIN — ROCURONIUM BROMIDE 50 MG: 10 INJECTION, SOLUTION INTRAVENOUS at 07:35

## 2024-02-01 RX ADMIN — LIDOCAINE HYDROCHLORIDE 60 MG: 20 INJECTION INTRAVENOUS at 07:35

## 2024-02-01 RX ADMIN — ROCURONIUM BROMIDE 10 MG: 10 INJECTION, SOLUTION INTRAVENOUS at 09:09

## 2024-02-01 RX ADMIN — DEXAMETHASONE SODIUM PHOSPHATE 4 MG: 4 INJECTION, SOLUTION INTRAMUSCULAR; INTRAVENOUS at 07:43

## 2024-02-01 RX ADMIN — SODIUM CHLORIDE, SODIUM LACTATE, POTASSIUM CHLORIDE, AND CALCIUM CHLORIDE: 600; 310; 30; 20 INJECTION, SOLUTION INTRAVENOUS at 07:31

## 2024-02-01 RX ADMIN — ROCURONIUM BROMIDE 20 MG: 10 INJECTION, SOLUTION INTRAVENOUS at 08:36

## 2024-02-01 RX ADMIN — FENTANYL CITRATE 100 MCG: 50 INJECTION, SOLUTION INTRAMUSCULAR; INTRAVENOUS at 07:35

## 2024-02-01 RX ADMIN — FENTANYL CITRATE 50 MCG: 50 INJECTION, SOLUTION INTRAMUSCULAR; INTRAVENOUS at 08:03

## 2024-02-01 RX ADMIN — MIDAZOLAM 2 MG: 1 INJECTION INTRAMUSCULAR; INTRAVENOUS at 07:32

## 2024-02-01 RX ADMIN — SODIUM CHLORIDE, SODIUM LACTATE, POTASSIUM CHLORIDE, AND CALCIUM CHLORIDE: 600; 310; 30; 20 INJECTION, SOLUTION INTRAVENOUS at 08:54

## 2024-02-01 RX ADMIN — CEFAZOLIN SODIUM 2 G: 2 INJECTION, SOLUTION INTRAVENOUS at 07:42

## 2024-02-01 SDOH — HEALTH STABILITY: MENTAL HEALTH: CURRENT SMOKER: 0

## 2024-02-01 ASSESSMENT — PAIN SCALES - PAIN ASSESSMENT IN ADVANCED DEMENTIA (PAINAD)
CONSOLABILITY: UNABLE TO CONSOLE, DISTRACT OR REASSURE
FACIALEXPRESSION: FACIAL GRIMACING
BREATHING: NORMAL
BREATHING: NORMAL
FACIALEXPRESSION: FACIAL GRIMACING
BODYLANGUAGE: TENSE, DISTRESSED PACING, FIDGETING
TOTALSCORE: 6
CONSOLABILITY: DISTRACTED OR REASSURED BY VOICE/TOUCH
TOTALSCORE: 6
NEGVOCALIZATION: OCCASIONAL MOAN/GROAN, LOW SPEECH, NEGATIVE/DISAPPROVING QUALITY
BODYLANGUAGE: RIGID, FISTS CLENCHED, KNEES UP, PUSHING/PULLING AWAY, STRIKES OUT
NEGVOCALIZATION: OCCASIONAL MOAN/GROAN, LOW SPEECH, NEGATIVE/DISAPPROVING QUALITY

## 2024-02-01 ASSESSMENT — PAIN DESCRIPTION - ORIENTATION: ORIENTATION: MID

## 2024-02-01 ASSESSMENT — PAIN SCALES - WONG BAKER
WONGBAKER_NUMERICALRESPONSE: HURTS WORST
WONGBAKER_NUMERICALRESPONSE: NO HURT
WONGBAKER_NUMERICALRESPONSE: HURTS WHOLE LOT

## 2024-02-01 ASSESSMENT — COLUMBIA-SUICIDE SEVERITY RATING SCALE - C-SSRS
2. HAVE YOU ACTUALLY HAD ANY THOUGHTS OF KILLING YOURSELF?: NO
1. IN THE PAST MONTH, HAVE YOU WISHED YOU WERE DEAD OR WISHED YOU COULD GO TO SLEEP AND NOT WAKE UP?: NO
6. HAVE YOU EVER DONE ANYTHING, STARTED TO DO ANYTHING, OR PREPARED TO DO ANYTHING TO END YOUR LIFE?: NO

## 2024-02-01 ASSESSMENT — PAIN - FUNCTIONAL ASSESSMENT
PAIN_FUNCTIONAL_ASSESSMENT: 0-10
PAIN_FUNCTIONAL_ASSESSMENT: WONG-BAKER FACES
PAIN_FUNCTIONAL_ASSESSMENT: 0-10

## 2024-02-01 ASSESSMENT — PAIN SCALES - GENERAL
PAIN_LEVEL: 0
PAINLEVEL_OUTOF10: 0 - NO PAIN
PAINLEVEL_OUTOF10: 10 - WORST POSSIBLE PAIN
PAINLEVEL_OUTOF10: 0 - NO PAIN
PAINLEVEL_OUTOF10: 4
PAINLEVEL_OUTOF10: 0 - NO PAIN
PAINLEVEL_OUTOF10: 0 - NO PAIN
PAINLEVEL_OUTOF10: 8
PAINLEVEL_OUTOF10: 0 - NO PAIN

## 2024-02-01 ASSESSMENT — PAIN DESCRIPTION - LOCATION: LOCATION: ABDOMEN

## 2024-02-01 NOTE — ANESTHESIA PROCEDURE NOTES
Airway  Date/Time: 2/1/2024 7:39 AM  Urgency: elective    Airway not difficult    Staffing  Performed: GIANNI   Authorized by: Carson Garcia MD    Performed by: Emily Wesley  Patient location during procedure: OR    Indications and Patient Condition  Indications for airway management: anesthesia  Spontaneous Ventilation: absent  Sedation level: deep  Preoxygenated: yes  Patient position: sniffing  MILS maintained throughout  Mask difficulty assessment: 1 - vent by mask  Planned trial extubation    Final Airway Details  Final airway type: endotracheal airway      Successful airway: ETT  Cuffed: yes   Successful intubation technique: video laryngoscopy  Facilitating devices/methods: intubating stylet  Endotracheal tube insertion site: oral  Blade: Vickie  Blade size: #4  ETT size (mm): 7.5  Cormack-Lehane Classification: grade IIb - view of arytenoids or posterior of glottis only  Placement verified by: chest auscultation and capnometry   Cuff volume (mL): 10  Measured from: lips  ETT to lips (cm): 22  Number of attempts at approach: 1  Ventilation between attempts: none  Number of other approaches attempted: 0

## 2024-02-01 NOTE — OP NOTE
ROBOTIC ASSISTED LAPAROSCOPIC BILATERAL INGUINAL HERNIA REPAIR WITH MESH (B), UMBILICAL HERNIA OPEN REPAIR Operative Note     Date: 2024  OR Location: [facility]    Name: Frank Shea, : 1965, Age: 58 y.o., MRN: [id number], Sex: male    Diagnosis  Pre-op Diagnosis     * Non-recurrent bilateral inguinal hernia without obstruction or gangrene [K40.20]     * Umbilical hernia without obstruction and without gangrene [K42.9] Post-op Diagnosis     * Non-recurrent bilateral inguinal hernia without obstruction or gangrene [K40.20]     * Umbilical hernia without obstruction and without gangrene [K42.9]     Procedures  ROBOTIC ASSISTED LAPAROSCOPIC BILATERAL INGUINAL HERNIA REPAIR WITH MESH  56619 - MO LAPAROSCOPY SURG RPR INITIAL INGUINAL HERNIA    UMBILICAL HERNIA OPEN REPAIR  73501 - [place] RPR AA HERNIA 1ST < 3 CM REDUCIBLE      Surgeons      * Isidro Chance - Primary    Resident/Fellow/Other Assistant:  Surgeon(s) and Role:    Procedure Summary  Anesthesia: General  ASA: I  Anesthesia Staff: Anesthesiologist: Carson Garcia MD  CRNA: DARRELL Andres-CRNA  SRNA: Emily Wesley  Estimated Blood Loss: 10 mL  Intra-op Medications:   Administrations occurring from 0730 to 1030 on 24:   Medication Name Total Dose   BUPivacaine HCl (Marcaine) 0.5 % (5 mg/mL) injection 20 mL   lactated Ringer's infusion 136.67 mL   ceFAZolin in dextrose (iso-os) (Ancef) IVPB 2 g 2 g              Anesthesia Record               Intraprocedure I/O Totals          Intake    lactated Ringer's infusion 1000.00 mL    ceFAZolin in dextrose (iso-os) (Ancef) IVPB 2 g 100.00 mL    Total Intake 1100 mL       Output    Urine 125 mL    Total Output 125 mL       Net    Net Volume 975 mL          Specimen: No specimens collected     Staff:   Circulator: Kanika Huynh RN; Lenore Britton RN  Scrub Person: Amanda Buckner; Aydee Morley; Gonzalo Newman         Drains and/or Catheters:   NG/OG/Feeding Tube OG - [place] sump 16 Fr [place]  mouth (Active)       Tourniquet Times:         Implants:  Implants       Type Name Action Serial No.      Surgical Mesh Sling Implant MESH, PROGRIP LAP, 10 X 15 CM, FLATSHEET - [id number] Implanted      Surgical Mesh Sling Implant MESH, PROGRIP LAP, 10 X 15 CM, FLATSHEET - [id number] Implanted               Findings: Right direct and indirect inguinal hernias.  Left indirect inguinal hernia.  Umbilical hernia.    Indications: Frank Shea is an 58 y.o. male who is having surgery for Non-recurrent bilateral inguinal hernia without obstruction or gangrene [K40.20]  Umbilical hernia without obstruction and without gangrene [K42.9].     The patient was seen in the preoperative area. The risks, benefits, complications, treatment options, non-operative alternatives, expected recovery and outcomes were discussed with the patient. The possibilities of reaction to medication, pulmonary aspiration, injury to surrounding structures, bleeding, recurrent infection, the need for additional procedures, failure to diagnose a condition, and creating a complication requiring transfusion or operation were discussed with the patient. The patient concurred with the proposed plan, giving informed consent.  The site of surgery was properly noted/marked if necessary per policy. The patient has been actively warmed in preoperative area. Preoperative antibiotics have been ordered and given within 1 hours of incision. Venous thrombosis prophylaxis have been ordered including bilateral sequential compression devices    Procedure Details: Following informed consent the patient was taken to the operating room and placed in supine position.  A huddle was performed.  The patient was given general anesthetic.  Sequential compression devices were in place and functioning.  The patient received Ancef IV in the operating room.  The abdomen was prepped and draped in sterile fashion.  A timeout was performed.  A left upper quadrant skin incision was made  at Duenas's point and a 5 mm Visiport inserted under direct vision.  The abdomen was insufflated with carbon dioxide to a pressure of 15 mmHg.  An 8 mm port was placed into the right upper quadrant under direct vision and another 8 mm port placed into the epigastric area just off midline under direct vision.  The left upper quadrant 5 mm port was exchanged for an 8 mm port.  The patient was placed into Trendelenburg position.  The robot was docked.  The pelvis was examined.  The patient was found to have right direct and indirect inguinal hernias and a left indirect inguinal hernia.  A hockey-stick shaped peritoneal incision was made in the [right] pelvis.  The region of the myopectineal orifice was dissected.  Dissection continued 2 cm across midline and 2 cm posterior to Kushal's ligament.  Lateral dissection was performed.  The spermatic vessels and vas deferens were parietalized.  [The direct and indirect inguinal hernias were]reduced.  Next a left pelvic peritoneal incision was made and dissection performed in the same manner as the right side.  The indirect inguinal hernia was reduced.  There was no cord lipoma noted on either side.  Hemostasis appeared to be excellent.  Two pieces of 10 x 15 cm diameter ProGrip mesh was inserted and secured bilaterally.  This mesh provided wide overlap of the myopectineal orifice.  Both pieces of mesh overlapped in the midline.  Insufflation pressure was decreased to 10 mmHg.  The peritoneum was reapproximated with a running absorbable barbed suture.  The abdomen was deflated and the ports were removed.  The fascia at the epigastric port was closed with interrupted 0 Vicryl sutures.  The umbilical hernia was then repaired.  A midline skin incision was made and the hernia sac freed from surrounding tissue and reduced.  The fascial defect was approximately 1 cm in diameter.  The fascia was freed of subcutaneous fat surrounding the hernia defect.  The fascia was then closed in a  transverse layer with interrupted 0 Prolene sutures.  The wound was closed in layers with interrupted 3-0 Vicryl subcutaneous sutures and a running 4-0 Vicryl subcuticular skin stitch.  Half percent plain Marcaine was infiltrated into each of the fascial incisions.  Skin incisions were closed with running 4-0 Vicryl subcuticular sutures.  Dressings were placed and the patient was taken to the recovery room in satisfactory condition having tolerated the procedure well.  Counts were correct.  Complications:  None; patient tolerated the procedure well.    Disposition: PACU - hemodynamically stable.  Condition: stable       Isidro Chance MD

## 2024-02-01 NOTE — ANESTHESIA PREPROCEDURE EVALUATION
Patient: Frank Shea    Procedure Information       Date/Time: 02/01/24 0730    Procedures:       ROBOTIC ASSISTED LAPAROSCOPIC BILATERAL INGUINAL HERNIA REPAIR WITH MESH/ POSSIBLE OPEN (Bilateral) - Robotic assisted laparoscopic bilateral inguinal hernia repair with mesh. Possible open repair.  Open repair of umbilical hernia possibly with mesh.      UMBILICAL HERNIA OPEN REPAIR WITH POSSIBLE MESH    Location: [facility] OR 09 / [facility]    Surgeons: Isidro Chance MD            Relevant Problems   Anesthesia (within normal limits)       Clinical information reviewed:    Allergies  Meds               NPO Detail:  NPO/Void Status  Date of Last Liquid: 01/31/24  Time of Last Liquid: 2130  Date of Last Solid: 01/31/24  Time of Last Solid: 2130         Physical Exam    Airway  Mallampati: II  TM distance: >3 FB  Neck ROM: full     Cardiovascular - normal exam     Dental - normal exam     Pulmonary - normal exam     Abdominal - normal exam             Anesthesia Plan    History of general anesthesia?: yes  History of complications of general anesthesia?: no    ASA 1     general     The patient is not a current smoker.    intravenous induction   Postoperative administration of opioids is intended.  Anesthetic plan and risks discussed with patient.    Plan discussed with CRNA and CAA.

## 2024-02-01 NOTE — ANESTHESIA POSTPROCEDURE EVALUATION
Patient: Frank Shea    Procedure Summary       Date: 02/01/24 Room / Location: [facility] OR 09 / [facility]    Anesthesia Start: 0730 Anesthesia Stop: 1015    Procedures:       ROBOTIC ASSISTED LAPAROSCOPIC BILATERAL INGUINAL HERNIA REPAIR WITH MESH (Bilateral)      UMBILICAL HERNIA OPEN REPAIR Diagnosis:       Non-recurrent bilateral inguinal hernia without obstruction or gangrene      Umbilical hernia without obstruction and without gangrene      (Non-recurrent bilateral inguinal hernia without obstruction or gangrene [K40.20])      (Umbilical hernia without obstruction and without gangrene [K42.9])    Surgeons: Isidro Chance MD Responsible Provider: Carson Garcia MD    Anesthesia Type: general ASA Status: 1            Anesthesia Type: general    Vitals Value Taken Time   /87 02/01/24 1015   Temp 36./8 02/01/24 1015   Pulse 87 02/01/24 1015   Resp 13 02/01/24 1015   SpO2 100 02/01/24 1015       Anesthesia Post Evaluation    Patient location during evaluation: PACU  Patient participation: complete - patient participated  Level of consciousness: awake and alert  Pain score: 0  Pain management: adequate  Multimodal analgesia pain management approach  Airway patency: patent  Cardiovascular status: acceptable  Respiratory status: acceptable  Hydration status: acceptable  Postoperative Nausea and Vomiting: none        There were no known notable events for this encounter.

## 2024-02-06 LAB
ATRIAL RATE: 87 BPM
P AXIS: 60 DEGREES
P OFFSET: 181 MS
P ONSET: 118 MS
PR INTERVAL: 196 MS
Q ONSET: 216 MS
QRS COUNT: 14 BEATS
QRS DURATION: 108 MS
QT INTERVAL: 376 MS
QTC CALCULATION(BAZETT): 452 MS
QTC FREDERICIA: 425 MS
R AXIS: 4 DEGREES
T AXIS: 48 DEGREES
T OFFSET: 404 MS
VENTRICULAR RATE: 87 BPM

## 2024-02-14 PROBLEM — Z09 POSTOP CHECK: Status: ACTIVE | Noted: 2024-02-14

## 2024-02-14 NOTE — PROGRESS NOTES
"History Of Present Illness  Frank Shea is a 58 y.o. male status post robotic assisted laparoscopic bilateral inguinal herniorrhaphy with mesh and open umbilical herniorrhaphy without mesh on February 1, 2024.  He was found to have right direct and indirect inguinal hernias and a left indirect inguinal hernia.  No complaints regarding the operation other than postop constipation which has resolved.  He returned to normal daily activity in about 7 days.  He did not take any oxycodone.     Last Recorded Vitals  Blood pressure 146/86, pulse 81, temperature 36.4 °C (97.6 °F), temperature source Temporal, resp. rate 16, height 1.727 m (5' 8\"), weight 77.1 kg (170 lb), SpO2 98 %.    Physical Exam  General: Well-developed, well-nourished, no acute distress, alert and oriented  Wounds: Intact, no erythema or signs of infection  Abdomen: Nondistended, soft and nontender  Extremities: No lower extremity edema or calf tenderness       Assessment/Plan   Diagnoses and all orders for this visit:  Postop check      Recovering well.  Follow-up as needed.       Isidro Chance MD  "

## 2024-02-19 ENCOUNTER — OFFICE VISIT (OUTPATIENT)
Dept: SURGERY | Facility: CLINIC | Age: 59
End: 2024-02-19
Payer: COMMERCIAL

## 2024-02-19 VITALS
HEART RATE: 81 BPM | WEIGHT: 170 LBS | HEIGHT: 68 IN | DIASTOLIC BLOOD PRESSURE: 86 MMHG | TEMPERATURE: 97.6 F | BODY MASS INDEX: 25.76 KG/M2 | SYSTOLIC BLOOD PRESSURE: 146 MMHG | OXYGEN SATURATION: 98 % | RESPIRATION RATE: 16 BRPM

## 2024-02-19 DIAGNOSIS — Z09 POSTOP CHECK: Primary | ICD-10-CM

## 2024-02-19 PROCEDURE — 1036F TOBACCO NON-USER: CPT | Performed by: SURGERY

## 2024-02-19 PROCEDURE — 99024 POSTOP FOLLOW-UP VISIT: CPT | Performed by: SURGERY

## 2024-02-19 NOTE — LETTER
"February 19, 2024     Georgi Burden MD  [address] [facility] [address]    Patient: Frank Shea   YOB: 1965   Date of Visit: 2/19/2024       Dear Dr. Georgi Burden MD:    Thank you for referring Frank Shea to me for evaluation. Below are my notes for this consultation.  If you have questions, please do not hesitate to call me. I look forward to following your patient along with you.       Sincerely,     Isidro Chance MD      CC: No Recipients  ______________________________________________________________________________________    History Of Present Illness  Frank Shea is a 58 y.o. male status post robotic assisted laparoscopic bilateral inguinal herniorrhaphy with mesh and open umbilical herniorrhaphy without mesh on February 1, 2024.  He was found to have right direct and indirect inguinal hernias and a left indirect inguinal hernia.  No complaints regarding the operation other than postop constipation which has resolved.  He returned to normal daily activity in about 7 days.  He did not take any oxycodone.     Last Recorded Vitals  Blood pressure 146/86, pulse 81, temperature 36.4 °C (97.6 °F), temperature source Temporal, resp. rate 16, height 1.727 m (5' 8\"), weight 77.1 kg (170 lb), SpO2 98 %.    Physical Exam  General: Well-developed, well-nourished, no acute distress, alert and oriented  Wounds: Intact, no erythema or signs of infection  Abdomen: Nondistended, soft and nontender  Extremities: No lower extremity edema or calf tenderness       Assessment/Plan  Diagnoses and all orders for this visit:  Postop check      Recovering well.  Follow-up as needed.       Isidro Chance MD    "

## 2024-04-19 NOTE — PROGRESS NOTES
"History Of Present Illness  Frank Shea is a 59 y.o. male status post robotic assisted laparoscopic bilateral inguinal herniorrhaphy with mesh and open umbilical herniorrhaphy without mesh on February 1, 2024.  He was found to have right direct and indirect inguinal hernias and a left indirect inguinal hernia.  8 days ago the patient developed numbness of his left third and fourth toes with walking and had a dull discomfort in the left groin region adjacent to the scrotum.  He did not feel any lump.  His symptoms have now resolved.  He does have a history of sciatica.     Last Recorded Vitals  Blood pressure 111/57, pulse 84, temperature 36.7 °C (98.1 °F), temperature source Temporal, resp. rate 16, height 1.727 m (5' 8\"), weight 81.4 kg (179 lb 6.4 oz), SpO2 98%.    Physical Exam  General: Well-developed, well-nourished, no acute distress, alert and oriented  Wound: Intact, no erythema or signs of infection  Abdomen: Nondistended, soft, nontender  No left inguinal or femoral hernias palpable.  No left inguinal lump palpable.     Assessment/Plan   Diagnoses and all orders for this visit:  Postop check    No recurrent inguinal or femoral hernia identified on examination.  Abdominal exam benign.  No palpable inguinal mass.  Uncertain etiology for the patient's symptoms, but they have now resolved.    I told the patient that the numbness of the left third and fourth toes would be unrelated to his operation or hernia.  I told him he should see his primary care physician for further evaluation of this.  Follow-up with me as needed.    Isidro Chance MD  "

## 2024-04-22 ENCOUNTER — OFFICE VISIT (OUTPATIENT)
Dept: SURGERY | Facility: CLINIC | Age: 59
End: 2024-04-22
Payer: COMMERCIAL

## 2024-04-22 VITALS
DIASTOLIC BLOOD PRESSURE: 57 MMHG | HEIGHT: 68 IN | BODY MASS INDEX: 27.19 KG/M2 | HEART RATE: 84 BPM | SYSTOLIC BLOOD PRESSURE: 111 MMHG | TEMPERATURE: 98.1 F | OXYGEN SATURATION: 98 % | WEIGHT: 179.4 LBS | RESPIRATION RATE: 16 BRPM

## 2024-04-22 DIAGNOSIS — Z09 POSTOP CHECK: Primary | ICD-10-CM

## 2024-04-22 PROCEDURE — 99024 POSTOP FOLLOW-UP VISIT: CPT | Performed by: SURGERY

## 2024-04-22 PROCEDURE — 1036F TOBACCO NON-USER: CPT | Performed by: SURGERY

## 2024-04-22 NOTE — LETTER
"April 22, 2024     Georgi Burden MD  [address] [facility] [address]    Patient: Frank Shea   YOB: 1965   Date of Visit: 4/22/2024       Dear Dr. Georgi Burden MD:    Thank you for referring Frank Shea to me for evaluation. Below are my notes for this consultation.  If you have questions, please do not hesitate to call me. I look forward to following your patient along with you.       Sincerely,     Isidro Chance MD      CC: No Recipients  ______________________________________________________________________________________    History Of Present Illness  Frank Shea is a 59 y.o. male status post robotic assisted laparoscopic bilateral inguinal herniorrhaphy with mesh and open umbilical herniorrhaphy without mesh on February 1, 2024.  He was found to have right direct and indirect inguinal hernias and a left indirect inguinal hernia.  8 days ago the patient developed numbness of his left third and fourth toes with walking and had a dull discomfort in the left groin region adjacent to the scrotum.  He did not feel any lump.  His symptoms have now resolved.  He does have a history of sciatica.     Last Recorded Vitals  Blood pressure 111/57, pulse 84, temperature 36.7 °C (98.1 °F), temperature source Temporal, resp. rate 16, height 1.727 m (5' 8\"), weight 81.4 kg (179 lb 6.4 oz), SpO2 98%.    Physical Exam  General: Well-developed, well-nourished, no acute distress, alert and oriented  Wound: Intact, no erythema or signs of infection  Abdomen: Nondistended, soft, nontender  No left inguinal or femoral hernias palpable.  No left inguinal lump palpable.     Assessment/Plan  Diagnoses and all orders for this visit:  Postop check    No recurrent inguinal or femoral hernia identified on examination.  Abdominal exam benign.  No palpable inguinal mass.  Uncertain etiology for the patient's symptoms, but they have now resolved.    I told the patient that the numbness of the left third and " fourth toes would be unrelated to his operation or hernia.  I told him he should see his primary care physician for further evaluation of this.  Follow-up with me as needed.    Isidro Chance MD

## 2025-02-23 ENCOUNTER — HOSPITAL ENCOUNTER (OUTPATIENT)
Dept: RADIOLOGY | Facility: CLINIC | Age: 60
Discharge: HOME | End: 2025-02-23
Payer: COMMERCIAL

## 2025-02-23 ENCOUNTER — OFFICE VISIT (OUTPATIENT)
Dept: URGENT CARE | Age: 60
End: 2025-02-23
Payer: COMMERCIAL

## 2025-02-23 VITALS
HEART RATE: 107 BPM | RESPIRATION RATE: 20 BRPM | TEMPERATURE: 98.4 F | OXYGEN SATURATION: 97 % | SYSTOLIC BLOOD PRESSURE: 132 MMHG | DIASTOLIC BLOOD PRESSURE: 80 MMHG

## 2025-02-23 DIAGNOSIS — R05.1 ACUTE COUGH: ICD-10-CM

## 2025-02-23 DIAGNOSIS — J20.9 ACUTE BRONCHITIS, UNSPECIFIED ORGANISM: Primary | ICD-10-CM

## 2025-02-23 PROCEDURE — 99203 OFFICE O/P NEW LOW 30 MIN: CPT | Performed by: NURSE PRACTITIONER

## 2025-02-23 PROCEDURE — 71046 X-RAY EXAM CHEST 2 VIEWS: CPT | Mod: FOREIGN READ | Performed by: RADIOLOGY

## 2025-02-23 PROCEDURE — 71046 X-RAY EXAM CHEST 2 VIEWS: CPT

## 2025-02-23 RX ORDER — BENZONATATE 200 MG/1
200 CAPSULE ORAL 3 TIMES DAILY PRN
Qty: 42 CAPSULE | Refills: 0 | Status: SHIPPED | OUTPATIENT
Start: 2025-02-23 | End: 2025-08-22

## 2025-02-23 RX ORDER — AZITHROMYCIN 250 MG/1
TABLET, FILM COATED ORAL
Qty: 6 TABLET | Refills: 0 | Status: SHIPPED | OUTPATIENT
Start: 2025-02-23

## 2025-02-23 RX ORDER — ALBUTEROL SULFATE 90 UG/1
2 INHALANT RESPIRATORY (INHALATION) EVERY 4 HOURS PRN
Qty: 8.5 G | Refills: 0 | Status: SHIPPED | OUTPATIENT
Start: 2025-02-23 | End: 2026-02-23

## 2025-02-23 RX ORDER — PREDNISONE 20 MG/1
40 TABLET ORAL DAILY
Qty: 10 TABLET | Refills: 0 | Status: SHIPPED | OUTPATIENT
Start: 2025-02-23 | End: 2025-02-28

## 2025-02-23 ASSESSMENT — ENCOUNTER SYMPTOMS
FEVER: 0
ACTIVITY CHANGE: 1
HEADACHES: 0
SORE THROAT: 0
SINUS PAIN: 0
COUGH: 1
WHEEZING: 1
SHORTNESS OF BREATH: 1
SINUS PRESSURE: 0
CHEST TIGHTNESS: 1
CHILLS: 1
FATIGUE: 1

## 2025-02-23 NOTE — PROGRESS NOTES
Subjective   Patient ID: Frank Shea is a 59 y.o. male. They present today with a chief complaint of Cough and Nasal Congestion (X 2 wks - tested positive for flu on 2/7).    History of Present Illness    Cough  Associated symptoms include chills, postnasal drip, shortness of breath and wheezing. Pertinent negatives include no chest pain, fever, headaches or sore throat.       Patient presents to urgent care for complaints of cough and congestion for 2 weeks.  He reports that he tested positive for flu on 2 - 7 - 25.  He been taking Tylenol and DayQuil which she reports no improvement of symptoms.    Past Medical History  Allergies as of 02/23/2025    (No Known Allergies)       (Not in a hospital admission)       No past medical history on file.    Past Surgical History:   Procedure Laterality Date    OTHER SURGICAL HISTORY  07/12/2021    No history of surgery        reports that he has never smoked. He has never used smokeless tobacco. He reports that he does not currently use alcohol. He reports that he does not use drugs.    Review of Systems  Review of Systems   Constitutional:  Positive for activity change, chills and fatigue. Negative for fever.   HENT:  Positive for congestion and postnasal drip. Negative for sinus pressure, sinus pain and sore throat.    Respiratory:  Positive for cough, chest tightness, shortness of breath and wheezing.    Cardiovascular:  Negative for chest pain.   Neurological:  Negative for headaches.                                  Objective    Vitals:    02/23/25 1225   BP: 132/80   Pulse: 107   Resp: 20   Temp: 36.9 °C (98.4 °F)   TempSrc: Temporal   SpO2: 97%     No LMP for male patient.    Physical Exam  Vitals reviewed.   Constitutional:       Appearance: Normal appearance.   HENT:      Head: Normocephalic.      Nose: Nose normal.      Mouth/Throat:      Mouth: Mucous membranes are moist.   Eyes:      Conjunctiva/sclera: Conjunctivae normal.   Cardiovascular:      Rate and  Rhythm: Regular rhythm. Tachycardia present.      Heart sounds: Normal heart sounds.   Pulmonary:      Effort: Pulmonary effort is normal.      Breath sounds: Rhonchi (right lower lung) present.      Comments: All other lobes clear  Neurological:      Mental Status: He is alert.         Procedures    Point of Care Test & Imaging Results from this visit  No results found for this visit on 02/23/25.   No results found.    Diagnostic study results (if any) were reviewed by ANABELL Hammonds.    Assessment/Plan   Allergies, medications, history, and pertinent labs/EKGs/Imaging reviewed by ANABELL Hammonds.     Medical Decision Making  Wet images reviewed, no acute consolidation identified.  Will start patient on prednisone, azithromycin, albuterol inhaler, and benzonatate for acute bronchitis.  If symptoms are not improving in the next week follow-up with your primary care provider.    At time of discharge patient was clinically well-appearing and HDS for outpatient management. The patient and/or family was educated regarding diagnosis, supportive care, OTC and Rx medications. The patient and/or family was given the opportunity to ask questions prior to discharge.  They verbalized understanding of my discussion of the plans for treatment, expected course, indications to return to  or seek further evaluation in ED, and the need for timely follow up as directed.   They were provided with a work/school excuse if requested.    Orders and Diagnoses  Diagnoses and all orders for this visit:  Acute bronchitis, unspecified organism  -     albuterol (ProAir HFA) 90 mcg/actuation inhaler; Inhale 2 puffs every 4 hours if needed for wheezing or shortness of breath.  -     azithromycin (Zithromax) 250 mg tablet; Take 2 tabs (500 mg) by mouth today, than 1 daily for 4 days.  -     predniSONE (Deltasone) 20 mg tablet; Take 2 tablets (40 mg) by mouth once daily for 5 days.  Acute cough  -     XR chest 2 views;  Future  -     benzonatate (Tessalon) 200 mg capsule; Take 1 capsule (200 mg) by mouth 3 times a day as needed for cough. Do not crush or chew.      Medical Admin Record      Patient disposition: Home    Electronically signed by ANABELL Hammonds  12:26 PM

## 2025-02-27 ENCOUNTER — OFFICE VISIT (OUTPATIENT)
Dept: URGENT CARE | Age: 60
End: 2025-02-27
Payer: COMMERCIAL

## 2025-02-27 VITALS
HEART RATE: 81 BPM | TEMPERATURE: 97.7 F | OXYGEN SATURATION: 97 % | SYSTOLIC BLOOD PRESSURE: 135 MMHG | RESPIRATION RATE: 15 BRPM | DIASTOLIC BLOOD PRESSURE: 64 MMHG

## 2025-02-27 DIAGNOSIS — R06.2 WHEEZING: ICD-10-CM

## 2025-02-27 DIAGNOSIS — J32.9 SINOBRONCHITIS: Primary | ICD-10-CM

## 2025-02-27 DIAGNOSIS — J40 SINOBRONCHITIS: Primary | ICD-10-CM

## 2025-02-27 RX ORDER — PREDNISONE 20 MG/1
40 TABLET ORAL DAILY
Qty: 6 TABLET | Refills: 0 | Status: SHIPPED | OUTPATIENT
Start: 2025-02-27 | End: 2025-03-02

## 2025-02-27 RX ORDER — DOXYCYCLINE 100 MG/1
100 CAPSULE ORAL 2 TIMES DAILY
Qty: 20 CAPSULE | Refills: 0 | Status: SHIPPED | OUTPATIENT
Start: 2025-02-27 | End: 2025-03-09

## 2025-02-27 RX ORDER — IPRATROPIUM BROMIDE AND ALBUTEROL SULFATE 2.5; .5 MG/3ML; MG/3ML
3 SOLUTION RESPIRATORY (INHALATION) ONCE
Status: COMPLETED | OUTPATIENT
Start: 2025-02-27 | End: 2025-02-27

## 2025-02-27 RX ADMIN — IPRATROPIUM BROMIDE AND ALBUTEROL SULFATE 3 ML: 2.5; .5 SOLUTION RESPIRATORY (INHALATION) at 20:00

## 2025-02-27 ASSESSMENT — ENCOUNTER SYMPTOMS
WHEEZING: 1
SINUS PAIN: 1
COUGH: 1
SINUS COMPLAINT: 1
FEVER: 0
SINUS PRESSURE: 1
CHILLS: 0
BACK PAIN: 1

## 2025-02-28 NOTE — PROGRESS NOTES
Subjective   Patient ID: Frank Shea is a 59 y.o. male. They present today with a chief complaint of Cough, Sinus Problem, and Back Pain (Sick X 4 days. TD-MA).    History of Present Illness    Cough  Associated symptoms include postnasal drip and wheezing. Pertinent negatives include no chills or fever.   Sinus Problem  Associated symptoms: congestion, cough and wheezing    Associated symptoms: no fever    Back Pain  Pertinent negatives include no fever.       Patient presents to urgent care complaints of cough, congestion and sinus pressure.  Patient was seen here 5 days ago and was diagnosed with bronchitis.  Patient was placed on prednisone, azithromycin and albuterol inhaler.  Patient reports that the symptoms are improved, but he still has some congestion and wheezing.  Patient had a chest x-ray which showed no acute cardiopulmonary process.  Reports now he has some sinus pressure as well.  Past Medical History  Allergies as of 02/27/2025    (No Known Allergies)       (Not in a hospital admission)       No past medical history on file.    Past Surgical History:   Procedure Laterality Date    OTHER SURGICAL HISTORY  07/12/2021    No history of surgery        reports that he has never smoked. He has never used smokeless tobacco. He reports that he does not currently use alcohol. He reports that he does not use drugs.    Review of Systems  Review of Systems   Constitutional:  Negative for chills and fever.   HENT:  Positive for congestion, postnasal drip, sinus pressure and sinus pain.    Respiratory:  Positive for cough and wheezing.    Musculoskeletal:  Positive for back pain.                                  Objective    Vitals:    02/27/25 1942   BP: 135/64   Pulse: 87   Resp: 15   Temp: 36.5 °C (97.7 °F)   SpO2: 94%     No LMP for male patient.    Physical Exam  Vitals reviewed.   Constitutional:       Appearance: Normal appearance.   HENT:      Head: Normocephalic.      Right Ear: Tympanic membrane, ear  canal and external ear normal.      Left Ear: Tympanic membrane, ear canal and external ear normal.      Nose: Congestion present.      Right Sinus: Maxillary sinus tenderness present.      Left Sinus: Maxillary sinus tenderness present.      Mouth/Throat:      Mouth: Mucous membranes are moist.      Pharynx: Postnasal drip present.   Eyes:      Conjunctiva/sclera: Conjunctivae normal.   Cardiovascular:      Rate and Rhythm: Normal rate and regular rhythm.      Heart sounds: Normal heart sounds.   Pulmonary:      Effort: Pulmonary effort is normal. No respiratory distress.      Breath sounds: Wheezing present.   Skin:     General: Skin is warm and dry.   Neurological:      Mental Status: He is alert.         Procedures    Point of Care Test & Imaging Results from this visit  No results found for this visit on 02/27/25.   No results found.    Diagnostic study results (if any) were reviewed by [facility].    Assessment/Plan   Allergies, medications, history, and pertinent labs/EKGs/Imaging reviewed by ANABELL Hammonds.     Medical Decision Making  Patient was given a DuoNeb in office and pulse ox did go up to 97% on room air.  Wheezing resolved after breathing treatment.  Patient states that he has not been using his inhaler at home.  Recommended to use the inhaler couple times a day to help with the wheezing.  Will place patient on another round of antibiotics and prednisone was extended for another 2 days.  Patient was not in acute respiratory distress and was speaking in full sentences.  Patient was afebrile and nontoxic-appearing.  Patient was placed on doxycycline.  He was told if his symptoms are not improving that he needs to follow-up with his primary care provider.  If he develops severe shortness of breath or chest pain recommend going to the emergency room for further evaluation.    At time of discharge patient was clinically well-appearing and HDS for outpatient management.  The patient and/or family was educated regarding diagnosis, supportive care, OTC and Rx medications. The patient and/or family was given the opportunity to ask questions prior to discharge.  They verbalized understanding of my discussion of the plans for treatment, expected course, indications to return to  or seek further evaluation in ED, and the need for timely follow up as directed.   They were provided with a work/school excuse if requested.      Orders and Diagnoses  Diagnoses and all orders for this visit:  Sinobronchitis  -     predniSONE (Deltasone) 20 mg tablet; Take 2 tablets (40 mg) by mouth once daily for 3 days.  -     doxycycline (Vibramycin) 100 mg capsule; Take 1 capsule (100 mg) by mouth 2 times a day for 10 days. Take with at least 8 ounces (large glass) of water, do not lie down for 30 minutes after  Wheezing  -     ipratropium-albuteroL (Duo-Neb) 0.5-2.5 mg/3 mL nebulizer solution 3 mL      Medical Admin Record      Patient disposition: Home    Electronically signed by [facility]  7:55 PM

## (undated) DEVICE — CATHETER TRAY, SURESTEP, 16FR, URINE METER W/STATLOCK

## (undated) DEVICE — CARE KIT, LAPAROSCOPIC, ADVANCED

## (undated) DEVICE — DRESSING, TRANSPARENT, TEGADERM, 2-3/8 X 2-3/4 IN

## (undated) DEVICE — TROCAR, KII OPTICAL BLADELESS 5MM Z THREAD 100MM LNGTH

## (undated) DEVICE — COVER, TIP HOT SHEARS ENDOWRIST

## (undated) DEVICE — SCISSORS, MONOPOLAR, CURVED, 8MM

## (undated) DEVICE — STRIP, SKIN CLOSURE, STERI STRIP, REINFORCED, 0.5 X 4 IN

## (undated) DEVICE — SYRINGE, 10 CC, SLIP TIP

## (undated) DEVICE — Device

## (undated) DEVICE — DRESSING, NON-ADHERENT, TELFA, OUCHLESS, 3 X 8 IN, STERILE

## (undated) DEVICE — SYRINGE, 20 CC, LUER SLIP

## (undated) DEVICE — BANDAGE, COFLEX, 6 X 5 YDS, FOAM TAN, STERILE, LF

## (undated) DEVICE — FORCEPS, PROGRASP, DAVINCI XI

## (undated) DEVICE — GRASPER TIP, MODIFIED RAPTOR, 5MM, DISP

## (undated) DEVICE — SLEEVE, VASO PRESS, CALF GARMENT, MEDIUM, GREEN

## (undated) DEVICE — DRESSING, TELFA, 3X4

## (undated) DEVICE — DRAPE, INCISE, ANTIMICROBIAL, IOBAN 2, LARGE, 17 X 23 IN, DISPOSABLE, STERILE

## (undated) DEVICE — SCISSOR TIP, ENDOCUT, LAPAROSCOPIC

## (undated) DEVICE — FILTER, LAPAROSCOPIC, PLUME PORT, W/LUER CONNECTOR, STERILE

## (undated) DEVICE — DRAPE, SHEET, THREE QUARTER, FAN FOLD, 57 X 77 IN

## (undated) DEVICE — DRAPE, ARM XI

## (undated) DEVICE — CANNULA, AIRSEAL, CAP & OBTURATOR 8MM

## (undated) DEVICE — CAUTERY, PENCIL, PUSH BUTTON, SMOKE EVAC, 70MM

## (undated) DEVICE — GOWN, ASTOUND, XL

## (undated) DEVICE — DRIVER, NEEDLE, MEGA, 8MM

## (undated) DEVICE — BANDAGE, GAUZE, CONFORMING, KERLIX, 6 PLY, 4.5 IN X 4.1 YD

## (undated) DEVICE — DRAPE, COLUMN, DAVINCI XI

## (undated) DEVICE — NEEDLE, HYPODERMIC, 25 G X 2 IN

## (undated) DEVICE — SEAL, UNIVERSAL 5-8MM  XI

## (undated) DEVICE — TUBING SET, BIFURCATED, SMOKE EVAC, ACTIVATED CHARCOAL FILTERED, F/AIRSEAL